# Patient Record
(demographics unavailable — no encounter records)

---

## 2024-12-04 NOTE — HISTORY OF PRESENT ILLNESS
[FreeTextEntry1] : 50 yo female with 10 year hx of worsening FLOWER.  She is .  She leaks when she laughs, coughs, or is active.  She has tried Kegel exercises and various medications in the past but she still leaks.  She requires several pads per day.    She does have an element of urgency with the primary issue is FLOWER.

## 2024-12-04 NOTE — ASSESSMENT
[FreeTextEntry1] : 48 yo female with significant FLOWER.  We discussed surgical treatment options for FLOWER.  I recommended that see one of our [facility] partners to further discuss her options.

## 2024-12-11 NOTE — HISTORY OF PRESENT ILLNESS
1:58am - Called [facility] to review pt for possible placement for [facility]. They are willing to review pt for possible placement.    3:09am - Information sent to [facility] for review via [employer]. Awaiting Update.     7:01am - Kathleen from [facility] called accepting the pt for their [facility]. ED can call [phone] when ready to give report.    7:09am - Notified [facility] ED of pt placement and provided all info to RN.     R: Patient Placement to [facility]/Dr. Nielsen   [FreeTextEntry1] : 2024 -- 49 F  with significant FLOWER. referred by Dr. Page.  She presents with c/o FLOWER. Reports worsening leakage with coughing, sneezing, and "yelling". She used to wear thin panty liners but now requires use of thick pads.  Denies urge incontinence. She has not had any recent UTIs. Denies gross hematuria or dysuria.   Not sexually active. Regular menses as per pt. No issues.  PVR: 0cc (done to rule out incomplete bladder emptying).  PMH: arthritis  PSH: abdominoplasty, tubal ligation  FH: no known  malignancies  SocHx: non smoker, social alcohol (ocassional wine) Meds: vitamins  Allergies: NKDA

## 2024-12-11 NOTE — HISTORY OF PRESENT ILLNESS
[FreeTextEntry1] : 2024 -- 49 F  with significant FLOWER. referred by Dr. Page.  She presents with c/o FLOWER. Reports worsening leakage with coughing, sneezing, and "yelling". She used to wear thin panty liners but now requires use of thick pads.  Denies urge incontinence. She has not had any recent UTIs. Denies gross hematuria or dysuria.   Not sexually active. Regular menses as per pt. No issues.  PVR: 0cc (done to rule out incomplete bladder emptying).  PMH: arthritis  PSH: abdominoplasty, tubal ligation  FH: no known  malignancies  SocHx: non smoker, social alcohol (ocassional wine) Meds: vitamins  Allergies: NKDA

## 2024-12-11 NOTE — ASSESSMENT
[FreeTextEntry1] : I discussed the findings and options with Ms. MICHELLE BECKWITH in detail.   A long discussion was held regarding the options for her predominantly stress urinary incontinence, including pelvic floor physical therapy, placement of a urethral sling, and urethral bulking agents (e.g. Bulkamid). The various procedures were described in detail with their risks and benefits. She understands that "do nothing" is an option.   - We discussed urodynamics in great detail to obtain more objective functional information on her bladder.  Bulkamid v sling.  - Urine sent for UA UC.   Patient expressed understanding.    The total amount of time I have personally spent preparing for this visit, reviewing the patient's test results, obtaining external history, ordering tests/medications, documenting clinical information, communicating with and counseling the patient/family and/or caregiver(s), reviewing old records, and spent face to face with the patient explaining the above was 35 minutes.

## 2024-12-11 NOTE — PHYSICAL EXAM
[Chaperone Present] : A chaperone was present in the examining room during all aspects of the physical examination [de-identified] : grade 1-2 cystocele. no FLOWER on supine cough stress test.  [FreeTextEntry2] : Efra HOLGUIN

## 2024-12-11 NOTE — ADDENDUM
[FreeTextEntry1] : A portion of this note was written by [Jaquan Melvin] on 12/09/2024 acting as a scribe for Dr. Talamantes.   I have personally reviewed the chart and agree that the record accurately reflects my personal performance of the history, physical exam, assessment, and plan.

## 2024-12-11 NOTE — PHYSICAL EXAM
[Chaperone Present] : A chaperone was present in the examining room during all aspects of the physical examination [de-identified] : grade 1-2 cystocele. no FLOWER on supine cough stress test.  [FreeTextEntry2] : Efra HOLGUIN

## 2025-01-30 NOTE — PHYSICAL EXAM
[de-identified] : grade 1-2 cystocele. no FLOWER on supine cough stress test.  [Chaperone Present] : A chaperone was present in the examining room during all aspects of the physical examination [FreeTextEntry2] : Efra HOLGUIN

## 2025-01-30 NOTE — ASSESSMENT
[FreeTextEntry1] : I discussed the findings and options with Ms. MICHELLE BECKWITH in detail.   Urodynamics:  Normal bladder sensation. Normal bladder capacity. Patient experiencing detrusor instability. The patient has urge incontinence. The uroflow rate is normal. The uroflow pattern is normal. The detrusor contractility is normal. The intravesical voiding pressure is normal. The patient has complete bladder emptying, a post void residual of 0 cc. The spincter activity is normal synergic.    Patient not interested in botox or oab medications.  Patient expressed interest in SNM. We discussed SNM in great detail. She understands that she needs to undergo PNE prior to SNM. I have described the procedure in great detail. Risks/benefits reviewed. SNM brochure was provided for her further review.  Return in office for PNE. Patient expressed understanding.

## 2025-01-30 NOTE — HISTORY OF PRESENT ILLNESS
[FreeTextEntry1] : 2025 -- 49 F  with significant FLOWER. referred by Dr. Page.  Reports no improvement with OAB medications >10 years ago. Patient does not remember the name of oab meds. She is not interested in botox and oab meds.    2024 -- 49 F  with significant FLOWER. referred by Dr. Page.  She presents with c/o FLOWER. Reports worsening leakage with coughing, sneezing, and "yelling". She used to wear thin panty liners but now requires use of thick pads.  Denies urge incontinence. She has not had any recent UTIs. Denies gross hematuria or dysuria.   Not sexually active. Regular menses as per pt. No issues.  PVR: 0cc (done to rule out incomplete bladder emptying).  PMH: arthritis  PSH: abdominoplasty, tubal ligation  FH: no known  malignancies  SocHx: non smoker, social alcohol (ocassional wine) Meds: vitamins  Allergies: NKDA

## 2025-01-30 NOTE — ADDENDUM
[FreeTextEntry1] : A portion of this note was written by [Jaquan Melvin] on 01/30/2025 acting as a scribe for Dr. Talamantes.   I have personally reviewed the chart and agree that the record accurately reflects my personal performance of the history, physical exam, assessment, and plan.

## 2025-03-31 NOTE — HISTORY OF PRESENT ILLNESS
[FreeTextEntry1] : 3/31/2025--49-year-old female with OAB, FLOWER. Failed 1st and 2nd line therapy in the past. Seen in office on 3/27/25 for PNE trial. Here today for lead removal. She is very happy with OAB symptom improvement. She would like to move forward with SNS.   2025 -- 49 F  with significant FLOWER. referred by Dr. Page. Reports no improvement with OAB medications >10 years ago. Patient does not remember the name of oab meds. She is not interested in botox and oab meds.   2024 -- 49 F  with significant FLOWER. referred by Dr. Page. She presents with c/o FLOWER. Reports worsening leakage with coughing, sneezing, and "yelling". She used to wear thin panty liners but now requires use of thick pads. Denies urge incontinence. She has not had any recent UTIs. Denies gross hematuria or dysuria.  Not sexually active. Regular menses as per pt. No issues.  PVR: 0cc (done to rule out incomplete bladder emptying).  PMH: arthritis PSH: abdominoplasty, tubal ligation FH: no known  malignancies SocHx: non smoker, social alcohol (ocassional wine) Meds: vitamins Allergies: NKDA

## 2025-03-31 NOTE — PHYSICAL EXAM
[General Appearance - Well Developed] : well developed [General Appearance - Well Nourished] : well nourished [Normal Appearance] : normal appearance [Well Groomed] : well groomed [General Appearance - In No Acute Distress] : no acute distress [] : no respiratory distress [Exaggerated Use Of Accessory Muscles For Inspiration] : no accessory muscle use [Normal Station and Gait] : the gait and station were normal for the patient's age [Skin Color & Pigmentation] : normal skin color and pigmentation [No Focal Deficits] : no focal deficits [Oriented To Time, Place, And Person] : oriented to person, place, and time [Mood] : the mood was normal [Not Anxious] : not anxious

## 2025-03-31 NOTE — PHYSICAL EXAM
[Chaperone Present] : A chaperone was present in the examining room during all aspects of the physical examination [de-identified] : grade 1-2 cystocele. no FLOWER on supine cough stress test.  [FreeTextEntry2] : Efra HOLGUIN

## 2025-03-31 NOTE — ADDENDUM
[FreeTextEntry1] : A portion of this note was written by [Jaquan Melvin] on 03/27/2025 acting as a scribe for Dr. Talamantes.   I have personally reviewed the chart and agree that the record accurately reflects my personal performance of the history, physical exam, assessment, and plan.

## 2025-03-31 NOTE — ASSESSMENT
[FreeTextEntry1] : Impression/plan: 49-year-old female with OAB, FLOWER. Happy with voiding pattern during PNE trial. Plans to schedule SNS placement. [employer] rep Ponce in office today. Will move forward with scheduling procedure.

## 2025-03-31 NOTE — PHYSICAL EXAM
[Chaperone Present] : A chaperone was present in the examining room during all aspects of the physical examination [de-identified] : grade 1-2 cystocele. no FLOWER on supine cough stress test.  [FreeTextEntry2] : Efra HOLGUIN

## 2025-03-31 NOTE — HISTORY OF PRESENT ILLNESS
[FreeTextEntry1] : 2025 -- 49 F  with significant mixed inc UUI>FLOWER on UDS. Referred by Dr. Page. She presents today for PNE.   2025 -- 49 F  with significant FLOWER. referred by Dr. Page.  Reports no improvement with OAB medications >10 years ago. Patient does not remember the name of oab meds. She is not interested in botox and oab meds.    2024 -- 49 F  with significant FLOWER. referred by Dr. Page.  She presents with c/o FLOWER. Reports worsening leakage with coughing, sneezing, and "yelling". She used to wear thin panty liners but now requires use of thick pads.  Denies urge incontinence. She has not had any recent UTIs. Denies gross hematuria or dysuria.   Not sexually active. Regular menses as per pt. No issues.  PVR: 0cc (done to rule out incomplete bladder emptying).  PMH: arthritis  PSH: abdominoplasty, tubal ligation  FH: no known  malignancies  SocHx: non smoker, social alcohol (ocassional wine) Meds: vitamins  Allergies: NKDA

## 2025-03-31 NOTE — HISTORY OF PRESENT ILLNESS
[FreeTextEntry1] : 2025 -- 49 F  with significant mixed inc UUI>FLOWER on UDS. Referred by Dr. Page. She presents today for PNE.   2025 -- 49 F  with significant FLOWER. referred by Dr. Page.  Reports no improvement with OAB medications >10 years ago. Patient does not remember the name of oab meds. She is not interested in botox and oab meds.    2024 -- 49 F  with significant FLOWER. referred by Dr. Page.  She presents with c/o FLWOER. Reports worsening leakage with coughing, sneezing, and "yelling". She used to wear thin panty liners but now requires use of thick pads.  Denies urge incontinence. She has not had any recent UTIs. Denies gross hematuria or dysuria.   Not sexually active. Regular menses as per pt. No issues.  PVR: 0cc (done to rule out incomplete bladder emptying).  PMH: arthritis  PSH: abdominoplasty, tubal ligation  FH: no known  malignancies  SocHx: non smoker, social alcohol (ocassional wine) Meds: vitamins  Allergies: NKDA